# Patient Record
Sex: FEMALE | Race: WHITE | NOT HISPANIC OR LATINO | Employment: UNEMPLOYED | URBAN - NONMETROPOLITAN AREA
[De-identification: names, ages, dates, MRNs, and addresses within clinical notes are randomized per-mention and may not be internally consistent; named-entity substitution may affect disease eponyms.]

---

## 2022-06-29 ENCOUNTER — OFFICE VISIT (OUTPATIENT)
Dept: PULMONOLOGY | Facility: CLINIC | Age: 67
End: 2022-06-29

## 2022-06-29 VITALS
HEART RATE: 90 BPM | SYSTOLIC BLOOD PRESSURE: 122 MMHG | WEIGHT: 185 LBS | BODY MASS INDEX: 34.93 KG/M2 | DIASTOLIC BLOOD PRESSURE: 84 MMHG | HEIGHT: 61 IN | OXYGEN SATURATION: 96 % | RESPIRATION RATE: 18 BRPM

## 2022-06-29 DIAGNOSIS — R06.83 SNORING: ICD-10-CM

## 2022-06-29 DIAGNOSIS — J44.9 ASTHMA WITH COPD: ICD-10-CM

## 2022-06-29 DIAGNOSIS — R94.2 ABNORMAL PFT: ICD-10-CM

## 2022-06-29 DIAGNOSIS — G47.19 EXCESSIVE DAYTIME SLEEPINESS: ICD-10-CM

## 2022-06-29 DIAGNOSIS — G47.33 OBSTRUCTIVE SLEEP APNEA: ICD-10-CM

## 2022-06-29 DIAGNOSIS — E66.9 OBESITY (BMI 30-39.9): ICD-10-CM

## 2022-06-29 DIAGNOSIS — R06.02 SHORTNESS OF BREATH: Primary | ICD-10-CM

## 2022-06-29 PROCEDURE — 95012 NITRIC OXIDE EXP GAS DETER: CPT | Performed by: INTERNAL MEDICINE

## 2022-06-29 PROCEDURE — 99204 OFFICE O/P NEW MOD 45 MIN: CPT | Performed by: INTERNAL MEDICINE

## 2022-06-29 RX ORDER — LISINOPRIL AND HYDROCHLOROTHIAZIDE 25; 20 MG/1; MG/1
TABLET ORAL
COMMUNITY

## 2022-06-29 RX ORDER — GLIPIZIDE 10 MG/1
10 TABLET, FILM COATED, EXTENDED RELEASE ORAL 3 TIMES DAILY
COMMUNITY

## 2022-06-29 RX ORDER — HYDROCODONE BITARTRATE AND ACETAMINOPHEN 7.5; 325 MG/1; MG/1
TABLET ORAL
COMMUNITY

## 2022-06-29 RX ORDER — SIMVASTATIN 40 MG
TABLET ORAL
COMMUNITY

## 2022-06-29 RX ORDER — PYRAZINAMIDE 500 MG/1
TABLET ORAL
COMMUNITY

## 2022-06-29 RX ORDER — DULOXETIN HYDROCHLORIDE 20 MG/1
CAPSULE, DELAYED RELEASE ORAL
COMMUNITY

## 2022-06-29 RX ORDER — TIZANIDINE 4 MG/1
TABLET ORAL
COMMUNITY

## 2022-06-29 RX ORDER — CYCLOBENZAPRINE HCL 10 MG
TABLET ORAL
COMMUNITY

## 2022-06-29 RX ORDER — GABAPENTIN 300 MG/1
100 CAPSULE ORAL 4 TIMES DAILY
COMMUNITY

## 2022-06-29 RX ORDER — MELOXICAM 7.5 MG/1
TABLET ORAL
COMMUNITY

## 2022-06-29 RX ORDER — ATORVASTATIN CALCIUM 10 MG/1
10 TABLET, FILM COATED ORAL NIGHTLY
COMMUNITY

## 2022-06-29 RX ORDER — KETOCONAZOLE 20 MG/G
CREAM TOPICAL
COMMUNITY

## 2022-06-29 RX ORDER — DAPAGLIFLOZIN 5 MG/1
TABLET, FILM COATED ORAL
COMMUNITY

## 2022-06-29 NOTE — PROGRESS NOTES
"Chief Complaint   Patient presents with   • Consult   • Breathing Problem         Subjective   Celeste Dorman is a 67 y.o. female.     History of Present Illness   Patient comes in today for evaluation of shortness of breath. Patient says that for the past 10 years, she has been noticing that her exercise tolerance has been declining. The patient has had days when walking any significant distance is difficult to do without stopping in the middle, to catch her breath.     Patient also complains of some cough and phlegm production that occurs on most mornings out of the week, for most weeks out of the month, for the past few years. Patient used to smoke 2 packs per day for the past 15-16 years and quit smoking in 1984.     She has been told that she has asthma. She feels that strong smells make things worse for her.     She has cats at home.     The patient says that she has never been on mechanical ventilation. she is currently not on oxygen.     she does have a family history of chronic obstructive disease, in her brother who had COPD and lung cancer.     Her father smoked indoors and also her  smoked till 1984.     Upon questioning, she is complaining of sleep disturbance. Patient says that for the past few years, she has had trouble with snoring. Patient has also been told that she sometimes quits breathing at night.       Patient says that she feels tired in the morning after waking up. she is also complaining of usually feeling sleepy while watching TV and sometimes while reading a book.    The patient has been told that she talks in her sleep.     Patient's sleep schedule was reviewed. she drinks 1-2 cups/cans of caffeinated drinks per day.     Patient is under management for hypertension & diabetes.       She was on CPAP before but had issues with it.  Upon further questioning, the patient actually says that she felt she was \"smothering\" with the CPAP.  She also had some issues with the mask.    The " "following portions of the patient's history were reviewed and updated as appropriate: allergies, current medications, past family history, past medical history, past social history and past surgical history.    Review of Systems   Constitutional: Positive for fatigue.   HENT: Negative for sneezing.    Respiratory: Positive for cough.    Psychiatric/Behavioral: Positive for sleep disturbance.   All other systems reviewed and are negative.      Objective   Visit Vitals  /84   Pulse 90   Resp 18   Ht 154.9 cm (61\")   Wt 83.9 kg (185 lb)   SpO2 96%   BMI 34.96 kg/m²     Physical Exam  Vitals reviewed.   Constitutional:       Appearance: She is well-developed.   HENT:      Head: Atraumatic.      Mouth/Throat:      Comments: Oropharynx was crowded.  Eyes:      Pupils: Pupils are equal, round, and reactive to light.   Neck:      Thyroid: No thyromegaly.      Vascular: No JVD.      Trachea: No tracheal deviation.      Comments: Increased neck circumference noted.  Cardiovascular:      Rate and Rhythm: Normal rate and regular rhythm.   Pulmonary:      Effort: Pulmonary effort is normal. No respiratory distress.      Breath sounds: Normal breath sounds.      Comments: Somewhat hyperresonant to percussion.  Somewhat decreased air entry.  Mild scattered wheezing noted.   Musculoskeletal:      Right lower leg: No edema.      Left lower leg: No edema.      Comments: Gait was normal.   Skin:     General: Skin is warm and dry.   Neurological:      Mental Status: She is alert and oriented to person, place, and time.   Psychiatric:         Mood and Affect: Mood normal.         Behavior: Behavior normal.         Assessment & Plan   Diagnoses and all orders for this visit:    1. Shortness of breath (Primary)  -     Nitric Oxide  -     Peak Flow  -     Pulmonary Function Test; Future  -     Polysomnography 4 or More Parameters; Future    2. Asthma with COPD (HCC)  -     Nitric Oxide  -     Peak Flow  -     Pulmonary Function Test; " Future  -     Polysomnography 4 or More Parameters; Future    3. Abnormal PFT    4. Obstructive sleep apnea  -     Polysomnography 4 or More Parameters; Future  -     COVID PRE-OP / PRE-PROCEDURE SCREENING ORDER (NO ISOLATION) - Swab, Nasopharynx; Future    5. Snoring  -     Polysomnography 4 or More Parameters; Future    6. Excessive daytime sleepiness  -     Polysomnography 4 or More Parameters; Future    7. Obesity (BMI 30-39.9)  -     Polysomnography 4 or More Parameters; Future    Other orders  -     Fluticasone Furoate-Vilanterol (Breo Ellipta) 200-25 MCG/INH inhaler; Inhale 1 puff Daily. Rinse mouth with water after use.  Dispense: 60 each; Refill: 5           Return in about 4 months (around 10/18/2022) for Recheck, Sleep study, PFT F/U, For Nelli Peacock), ....Also 8 mths w/ Dr. Snider.    DISCUSSION (if any):  Last CT scan was reviewed in great detail with the patient. Images reviewed personally.   The CT scan suggested mostly apical emphysematous changes.  No other abnormality was identified.  This was performed in April 2022.    I have also reviewed her provider's office note that mentions shortness of breath with some improvement with inhalers.  It also mentioned CT performed in the ER that showed mild emphysema.  In note from March 2022 also listed A1c of 8.8 with elevated blood sugars in the 200s.    ===========================  ===========================    I reviewed the spirometry results, from April 12, 2022.  Unfortunately there was no loop for me to review but the values did suggest?  Restriction.    Full PFTs were ordered for follow up visit.     Laboratory workup was also reviewed which showed carbon oxide level of 26.  Glucose was slightly elevated 181.    ===========================  ===========================    FeNO level was 26 today.    Peak flow was 240 LPM today.    Based on her symptoms it appears that she has asthma with COPD syndrome.  This may have at least some impact on her  management in the future.    Orders as above.    Abnormal Spirometry likely is from obesity but could also be from other etiologies.    Will do a full PFTs before ordering further work up, if needed.     The patient was started on Breo with instructions to rinse her mouth with water after use.     Other contributing factors may also need to be treated and this will be discussed with the patient, if and when applicable    Patient was advised to use rescue inhaler for when necessary purposes    Patient was also advised to keep a log of the use of rescue inhaler.    We will try to obtain her last sleep study results from the performing facility, if not already scanned in our system.     Since the patient is having significant issues, the only realistic option is for her to undergo a split study.    She may need BiPAP, given some of her symptoms could have been from the fact that she was on CPAP.    Patient was advised to call this office with any issues.    Patient was given reading material.        Dictated utilizing Dragon dictation.    This document was electronically signed by Miri Snider MD on 06/29/22 at 14:59 EDT

## 2022-07-20 ENCOUNTER — HOSPITAL ENCOUNTER (OUTPATIENT)
Dept: SLEEP MEDICINE | Facility: HOSPITAL | Age: 67
Discharge: HOME | End: 2022-07-20
Admitting: INTERNAL MEDICINE

## 2022-07-20 DIAGNOSIS — E66.9 OBESITY (BMI 30-39.9): ICD-10-CM

## 2022-07-20 DIAGNOSIS — R06.02 SHORTNESS OF BREATH: ICD-10-CM

## 2022-07-20 DIAGNOSIS — J44.9 ASTHMA WITH COPD: ICD-10-CM

## 2022-07-20 DIAGNOSIS — R06.83 SNORING: ICD-10-CM

## 2022-07-20 DIAGNOSIS — G47.33 OBSTRUCTIVE SLEEP APNEA: ICD-10-CM

## 2022-07-20 DIAGNOSIS — G47.19 EXCESSIVE DAYTIME SLEEPINESS: ICD-10-CM

## 2022-07-20 PROCEDURE — 95810 POLYSOM 6/> YRS 4/> PARAM: CPT | Performed by: INTERNAL MEDICINE

## 2022-07-20 PROCEDURE — 95810 POLYSOM 6/> YRS 4/> PARAM: CPT

## 2022-07-28 DIAGNOSIS — G47.33 OSA (OBSTRUCTIVE SLEEP APNEA): Primary | ICD-10-CM

## 2023-03-08 ENCOUNTER — OFFICE VISIT (OUTPATIENT)
Dept: PULMONOLOGY | Facility: CLINIC | Age: 68
End: 2023-03-08
Payer: MEDICARE

## 2023-03-08 VITALS
RESPIRATION RATE: 16 BRPM | HEIGHT: 61 IN | OXYGEN SATURATION: 96 % | DIASTOLIC BLOOD PRESSURE: 84 MMHG | HEART RATE: 77 BPM | WEIGHT: 186 LBS | SYSTOLIC BLOOD PRESSURE: 124 MMHG | BODY MASS INDEX: 35.12 KG/M2

## 2023-03-08 DIAGNOSIS — R06.02 SHORTNESS OF BREATH: ICD-10-CM

## 2023-03-08 DIAGNOSIS — J44.9 ASTHMA WITH COPD: ICD-10-CM

## 2023-03-08 DIAGNOSIS — G47.33 OSA (OBSTRUCTIVE SLEEP APNEA): Primary | ICD-10-CM

## 2023-03-08 DIAGNOSIS — E66.9 OBESITY (BMI 30-39.9): ICD-10-CM

## 2023-03-08 PROCEDURE — 94729 DIFFUSING CAPACITY: CPT | Performed by: INTERNAL MEDICINE

## 2023-03-08 PROCEDURE — 94726 PLETHYSMOGRAPHY LUNG VOLUMES: CPT | Performed by: INTERNAL MEDICINE

## 2023-03-08 PROCEDURE — 99214 OFFICE O/P EST MOD 30 MIN: CPT | Performed by: NURSE PRACTITIONER

## 2023-03-08 PROCEDURE — 94060 EVALUATION OF WHEEZING: CPT | Performed by: INTERNAL MEDICINE

## 2023-03-08 RX ORDER — LISINOPRIL 40 MG/1
40 TABLET ORAL DAILY
COMMUNITY

## 2023-03-08 RX ORDER — GABAPENTIN 100 MG/1
CAPSULE ORAL
COMMUNITY
Start: 2023-03-01

## 2023-03-08 RX ORDER — FLUTICASONE FUROATE AND VILANTEROL 200; 25 UG/1; UG/1
1 POWDER RESPIRATORY (INHALATION)
Qty: 60 EACH | Refills: 5 | Status: SHIPPED | OUTPATIENT
Start: 2023-03-08

## 2023-03-08 RX ORDER — ALBUTEROL SULFATE 90 UG/1
2 AEROSOL, METERED RESPIRATORY (INHALATION) EVERY 4 HOURS PRN
Qty: 18 G | Refills: 2 | Status: SHIPPED | OUTPATIENT
Start: 2023-03-08

## 2023-03-08 RX ORDER — AMLODIPINE BESYLATE 2.5 MG/1
1 TABLET ORAL DAILY
COMMUNITY
Start: 2023-02-12

## 2023-03-08 NOTE — PROGRESS NOTES
"Chief Complaint   Patient presents with   • Shortness of Breath   • Follow-up         Subjective   Celeste Dorman is a 67 y.o. female.     History of Present Illness   The patient comes in today for follow-up of obstructive sleep apnea and SOB    I reviewed her sleep study and discussed the results with her today.  The sleep study revealed mild sleep apnea with an apnea hypopnea index of 7 per hour.  Event related hypoxemia was noted.     She has been set up with AutoPAP at a pressure of 6/14.      Patient says that since the last office visit she has had no recent exacerbations. she denies any emergency room visits or hospitalizations, due to her asthma.     The patient says that she is compliant with pulmonary medicines, as prescribed. She is using Breo daily. She feels like it has helped.     She is able to do her own grocery shopping without being SOB. Otherwise she states she does not walk much.     She has intermittent cough. Not sure if related to season changes.       The following portions of the patient's history were reviewed and updated as appropriate: allergies, current medications, past family history, past medical history, past social history and past surgical history.    Review of Systems   HENT: Negative for sinus pressure, sneezing and sore throat.    Respiratory: Negative for cough, chest tightness, shortness of breath and wheezing.        Objective   Visit Vitals  /84   Pulse 77   Resp 16   Ht 154.9 cm (61\") Comment: pt reported   Wt 84.4 kg (186 lb)   SpO2 96%   BMI 35.14 kg/m²         Physical Exam  Vitals reviewed.   HENT:      Head: Atraumatic.      Mouth/Throat:      Mouth: Mucous membranes are moist.      Comments: Crowded oropharynx. Dentures noted.   Eyes:      Extraocular Movements: Extraocular movements intact.   Cardiovascular:      Rate and Rhythm: Normal rate and regular rhythm.   Pulmonary:      Effort: Pulmonary effort is normal. No respiratory distress.      Comments: " Somewhat decreased A/E without wheezing.   Abdominal:      Comments: Obese abdomen.    Musculoskeletal:      Comments: Gait normal.   Skin:     General: Skin is warm.   Neurological:      Mental Status: She is alert and oriented to person, place, and time.             Assessment & Plan   Diagnoses and all orders for this visit:    1. NANCY (obstructive sleep apnea) (Primary)    2. Asthma with COPD (HCC)    3. Obesity (BMI 30-39.9)    Other orders  -     albuterol sulfate HFA (Ventolin HFA) 108 (90 Base) MCG/ACT inhaler; Inhale 2 puffs Every 4 (Four) Hours As Needed for Wheezing or Shortness of Air.  Dispense: 18 g; Refill: 2  -     Fluticasone Furoate-Vilanterol (Breo Ellipta) 200-25 MCG/ACT inhaler; Inhale 1 puff Daily.  Dispense: 60 each; Refill: 5           Return in about 6 months (around 9/8/2023) for Recheck, For Dr. Snider.    DISCUSSION (if any):  PFT today consistent with no obstruction. No restriction without suggestion of air trapping.     Her symptoms of asthma are under adequate control at this time.  She should continue using Breo daily and the rescue medication as needed for shortness of breath and wheezing.    Patient's medications for underlying asthma were reviewed with her in great detail.    Any needed adjustments to her pulmonary medications, either for clinical or insurance coverage reasons, have been made and are reflected in the orders.    Side effects of prescribed medications discussed with the patient.    Asthma action plan with discussed with her.    The patient was asked to call this office if the symptoms worsen.    I have asked her to put the mask on her face and wear it 30 to 40 minutes a day just to get use to the mask being on her face.  The mask does not need to be hooked to the machine or even the tubing.    I have told her she must initiate therapy with the machine every single night in order to give herself an opportunity to try to get used to wearing the mask/machine nightly.  She  verbalizes understanding.    She may benefit from overnight pulse oximetry in the future once she gets used to CPAP therapy nightly.    She thinks she had a pneumonia vaccine last year at PCP.       Dictated utilizing Dragon dictation.    This document was electronically signed by AMY Berg March 8, 2023  13:15 EST

## 2023-09-11 RX ORDER — ALBUTEROL SULFATE 90 UG/1
AEROSOL, METERED RESPIRATORY (INHALATION)
Qty: 1 G | Refills: 2 | Status: SHIPPED | OUTPATIENT
Start: 2023-09-11

## 2023-11-07 RX ORDER — ALBUTEROL SULFATE 90 UG/1
2 AEROSOL, METERED RESPIRATORY (INHALATION) EVERY 4 HOURS PRN
Qty: 18 G | Refills: 0 | Status: SHIPPED | OUTPATIENT
Start: 2023-11-07 | End: 2023-11-08

## 2023-11-08 RX ORDER — ALBUTEROL SULFATE 90 UG/1
AEROSOL, METERED RESPIRATORY (INHALATION)
Qty: 18 G | Refills: 0 | Status: SHIPPED | OUTPATIENT
Start: 2023-11-08

## 2023-11-30 RX ORDER — ALBUTEROL SULFATE 90 UG/1
AEROSOL, METERED RESPIRATORY (INHALATION)
OUTPATIENT
Start: 2023-11-30

## 2024-01-08 RX ORDER — ALBUTEROL SULFATE 90 UG/1
AEROSOL, METERED RESPIRATORY (INHALATION)
OUTPATIENT
Start: 2024-01-08

## 2024-03-27 DIAGNOSIS — J44.9 CHRONIC OBSTRUCTIVE PULMONARY DISEASE, UNSPECIFIED COPD TYPE: Primary | ICD-10-CM

## 2024-03-27 RX ORDER — ALBUTEROL SULFATE 90 UG/1
AEROSOL, METERED RESPIRATORY (INHALATION)
Qty: 18 G | Refills: 0 | Status: SHIPPED | OUTPATIENT
Start: 2024-03-27

## 2024-04-25 DIAGNOSIS — J44.9 CHRONIC OBSTRUCTIVE PULMONARY DISEASE, UNSPECIFIED COPD TYPE: ICD-10-CM

## 2024-04-25 RX ORDER — ALBUTEROL SULFATE 90 UG/1
AEROSOL, METERED RESPIRATORY (INHALATION)
OUTPATIENT
Start: 2024-04-25

## 2024-05-30 ENCOUNTER — OFFICE VISIT (OUTPATIENT)
Dept: PULMONOLOGY | Facility: CLINIC | Age: 69
End: 2024-05-30
Payer: MEDICARE

## 2024-05-30 VITALS
HEART RATE: 90 BPM | RESPIRATION RATE: 14 BRPM | SYSTOLIC BLOOD PRESSURE: 128 MMHG | BODY MASS INDEX: 35.12 KG/M2 | HEIGHT: 61 IN | DIASTOLIC BLOOD PRESSURE: 72 MMHG | OXYGEN SATURATION: 96 % | WEIGHT: 186 LBS

## 2024-05-30 DIAGNOSIS — G47.33 OBSTRUCTIVE SLEEP APNEA: ICD-10-CM

## 2024-05-30 DIAGNOSIS — J45.30 MILD PERSISTENT ASTHMA WITHOUT COMPLICATION: Primary | ICD-10-CM

## 2024-05-30 DIAGNOSIS — R06.83 SNORING: ICD-10-CM

## 2024-05-30 DIAGNOSIS — G47.52 DREAM ENACTMENT BEHAVIOR: ICD-10-CM

## 2024-05-30 DIAGNOSIS — G47.8 SLEEP TALKING: ICD-10-CM

## 2024-05-30 DIAGNOSIS — G47.19 EXCESSIVE DAYTIME SLEEPINESS: ICD-10-CM

## 2024-05-30 PROCEDURE — 99214 OFFICE O/P EST MOD 30 MIN: CPT | Performed by: INTERNAL MEDICINE

## 2024-05-30 RX ORDER — DIAZEPAM 5 MG/1
TABLET ORAL
COMMUNITY
Start: 2024-05-22

## 2024-05-30 RX ORDER — PANTOPRAZOLE SODIUM 40 MG/1
40 TABLET, DELAYED RELEASE ORAL DAILY
COMMUNITY
Start: 2024-04-25

## 2024-05-30 RX ORDER — TIRZEPATIDE 5 MG/.5ML
INJECTION, SOLUTION SUBCUTANEOUS
COMMUNITY
Start: 2024-05-29

## 2024-05-30 NOTE — PROGRESS NOTES
"  Chief Complaint   Patient presents with    Breathing Problem    Follow-up    Sleeping Problem       Subjective   Celeste Dorman is a 69 y.o. female.   Patient was evaluated today for follow up of asthma, and NANCY.     Patient does not report any recent exacerbations requiring emergency room visits or hospitalizations.     Patient is compliant with pulmonary medicines, as prescribed.     she was on Breo till about 3 months ago. she is using the rescue inhalers minimally.     The patient has not used CPAP for the past few months and denies any daytime sleepiness or fatigue.  she does however snore loudly, without CPAP.    she does talk in her sleep.     The following portions of the patient's history were reviewed and updated as appropriate: allergies, current medications, past family history, past medical history, past social history, and past surgical history.    Review of Systems   HENT:  Negative for sinus pressure, sneezing and sore throat.    Respiratory:  Negative for cough, chest tightness, shortness of breath and wheezing.        Objective   Visit Vitals  /72   Pulse 90   Resp 14   Ht 154.9 cm (61\") Comment: pt reported   Wt 84.4 kg (186 lb)   SpO2 96%   BMI 35.14 kg/m²         BMI Readings from Last 3 Encounters:   05/30/24 35.14 kg/m²   03/08/23 35.14 kg/m²   06/29/22 34.96 kg/m²       Physical Exam  Vitals reviewed.   Constitutional:       Appearance: She is well-developed.   HENT:      Head: Normocephalic and atraumatic.      Mouth/Throat:      Comments: Oropharynx was crowded.  Eyes:      Extraocular Movements: Extraocular movements intact.   Cardiovascular:      Rate and Rhythm: Normal rate.   Pulmonary:      Comments: Somewhat hyperresonant to percussion.  Somewhat decreased air entry.  No obvious wheezing noted.   Musculoskeletal:      Cervical back: Neck supple.   Neurological:      Mental Status: She is alert and oriented to person, place, and time.           Assessment & Plan   Diagnoses and " all orders for this visit:    1. Mild persistent asthma without complication (Primary)  -     Polysomnography 4 or More Parameters With CPAP; Future    2. Obstructive sleep apnea  -     Polysomnography 4 or More Parameters With CPAP; Future    3. Snoring    4. Excessive daytime sleepiness    5. Dream enactment behavior    6. Sleep talking           Return in about 5 months (around 10/25/2024) for Recheck, For Nelli Peacock).    DISCUSSION (if any):  It appears that her symptoms of asthma are under good control with the current regimen.    Patient's medications for underlying asthma were reviewed in great detail.    Compliance with medications stressed.     Side effects of prescribed medications discussed with the patient.    The need to continue to be aware of triggers that may cause asthma exacerbation versus progression of disease, was also discussed.    I have discussed asthma action plan with her.    The patient was asked to call this office if the symptoms worsen.    The patient was asked to restart Breo on a regular basis if her symptoms worsen or if she requires her rescue inhaler more than 4 to 5 times a week or if she has any night time symptoms or if she has any exacerbation while she is not on regular schedule dose of a maintenance inhaler.     Sleep study performed in July 2022  AHI was 7 / hour.     Latest PAP device provided in Aug 2022    I told the patient that her symptoms may be consistent with poorly controlled sleep apnea.    Since the patient is having significant issues, the only realistic option is for her to undergo a full night titration study.    Patient was advised to continue using PAP for at least 4 hours every night.    Patient was advised to call this office with any issues.    Dictated utilizing Dragon dictation.    This document was electronically signed by Miri Snider MD on 05/30/24 at 15:51 EDT

## 2024-06-20 ENCOUNTER — HOSPITAL ENCOUNTER (OUTPATIENT)
Dept: SLEEP MEDICINE | Facility: HOSPITAL | Age: 69
End: 2024-06-20
Payer: MEDICARE

## 2024-06-20 DIAGNOSIS — J45.30 MILD PERSISTENT ASTHMA WITHOUT COMPLICATION: ICD-10-CM

## 2024-06-20 DIAGNOSIS — G47.33 OBSTRUCTIVE SLEEP APNEA: ICD-10-CM

## 2024-06-20 PROCEDURE — 95811 POLYSOM 6/>YRS CPAP 4/> PARM: CPT

## 2024-06-26 DIAGNOSIS — J44.9 CHRONIC OBSTRUCTIVE PULMONARY DISEASE, UNSPECIFIED COPD TYPE: ICD-10-CM

## 2024-06-26 RX ORDER — ALBUTEROL SULFATE 90 UG/1
AEROSOL, METERED RESPIRATORY (INHALATION)
Qty: 18 G | Refills: 0 | Status: SHIPPED | OUTPATIENT
Start: 2024-06-26

## 2024-07-01 DIAGNOSIS — G47.33 OSA (OBSTRUCTIVE SLEEP APNEA): Primary | ICD-10-CM

## 2024-07-23 DIAGNOSIS — J44.9 CHRONIC OBSTRUCTIVE PULMONARY DISEASE, UNSPECIFIED COPD TYPE: ICD-10-CM

## 2024-07-23 RX ORDER — ALBUTEROL SULFATE 90 UG/1
AEROSOL, METERED RESPIRATORY (INHALATION)
Qty: 18 G | Refills: 1 | Status: SHIPPED | OUTPATIENT
Start: 2024-07-23

## 2024-08-24 DIAGNOSIS — J44.9 CHRONIC OBSTRUCTIVE PULMONARY DISEASE, UNSPECIFIED COPD TYPE: ICD-10-CM

## 2024-08-26 RX ORDER — ALBUTEROL SULFATE 90 UG/1
AEROSOL, METERED RESPIRATORY (INHALATION)
Qty: 18 G | Refills: 1 | Status: SHIPPED | OUTPATIENT
Start: 2024-08-26

## 2024-11-22 DIAGNOSIS — J44.9 CHRONIC OBSTRUCTIVE PULMONARY DISEASE, UNSPECIFIED COPD TYPE: ICD-10-CM

## 2024-11-22 RX ORDER — ALBUTEROL SULFATE 90 UG/1
INHALANT RESPIRATORY (INHALATION)
Qty: 18 G | Refills: 1 | Status: SHIPPED | OUTPATIENT
Start: 2024-11-22

## 2025-01-22 DIAGNOSIS — J44.9 CHRONIC OBSTRUCTIVE PULMONARY DISEASE, UNSPECIFIED COPD TYPE: ICD-10-CM

## 2025-01-22 RX ORDER — ALBUTEROL SULFATE 90 UG/1
INHALANT RESPIRATORY (INHALATION)
Qty: 18 G | Refills: 1 | Status: SHIPPED | OUTPATIENT
Start: 2025-01-22

## 2025-04-22 DIAGNOSIS — J44.9 CHRONIC OBSTRUCTIVE PULMONARY DISEASE, UNSPECIFIED COPD TYPE: ICD-10-CM

## 2025-04-22 RX ORDER — ALBUTEROL SULFATE 90 UG/1
INHALANT RESPIRATORY (INHALATION)
Refills: 1 | OUTPATIENT
Start: 2025-04-22

## 2025-05-17 DIAGNOSIS — J44.9 CHRONIC OBSTRUCTIVE PULMONARY DISEASE, UNSPECIFIED COPD TYPE: ICD-10-CM

## 2025-05-19 RX ORDER — ALBUTEROL SULFATE 90 UG/1
INHALANT RESPIRATORY (INHALATION)
Refills: 1 | OUTPATIENT
Start: 2025-05-19

## 2025-05-22 DIAGNOSIS — J44.9 CHRONIC OBSTRUCTIVE PULMONARY DISEASE, UNSPECIFIED COPD TYPE: ICD-10-CM

## 2025-05-22 RX ORDER — ALBUTEROL SULFATE 90 UG/1
INHALANT RESPIRATORY (INHALATION)
Qty: 18 G | Refills: 1 | Status: SHIPPED | OUTPATIENT
Start: 2025-05-22

## 2025-07-19 DIAGNOSIS — J44.9 CHRONIC OBSTRUCTIVE PULMONARY DISEASE, UNSPECIFIED COPD TYPE: ICD-10-CM

## 2025-07-21 RX ORDER — ALBUTEROL SULFATE 90 UG/1
INHALANT RESPIRATORY (INHALATION)
Qty: 18 G | Refills: 0 | Status: SHIPPED | OUTPATIENT
Start: 2025-07-21

## 2025-08-18 DIAGNOSIS — J44.9 CHRONIC OBSTRUCTIVE PULMONARY DISEASE, UNSPECIFIED COPD TYPE: ICD-10-CM

## 2025-08-18 RX ORDER — ALBUTEROL SULFATE 90 UG/1
INHALANT RESPIRATORY (INHALATION)
OUTPATIENT
Start: 2025-08-18